# Patient Record
Sex: MALE | Race: WHITE | NOT HISPANIC OR LATINO | Employment: FULL TIME | URBAN - METROPOLITAN AREA
[De-identification: names, ages, dates, MRNs, and addresses within clinical notes are randomized per-mention and may not be internally consistent; named-entity substitution may affect disease eponyms.]

---

## 2018-08-02 ENCOUNTER — HOSPITAL ENCOUNTER (OUTPATIENT)
Dept: FAMILY MEDICINE CLINIC | Facility: CLINIC | Age: 28
Setting detail: SPECIMEN
Discharge: HOME | End: 2018-08-02
Attending: FAMILY MEDICINE | Admitting: FAMILY MEDICINE

## 2018-08-02 LAB
ALBUMIN SERPL-MCNC: 4.6 G/DL (ref 3.5–4.8)
ALBUMIN/GLOB SERPL: 1.8 {RATIO} (ref 1–1.7)
ALP SERPL-CCNC: 58 IU/L (ref 32–91)
ALT SERPL-CCNC: 18 IU/L (ref 17–63)
ANION GAP SERPL CALC-SCNC: 11.2 MMOL/L (ref 10–20)
AST SERPL-CCNC: 19 IU/L (ref 15–41)
BILIRUB SERPL-MCNC: 0.6 MG/DL (ref 0.3–1.2)
BUN SERPL-MCNC: 14 MG/DL (ref 8–20)
BUN/CREAT SERPL: 10.8 (ref 6.2–20.3)
CALCIUM SERPL-MCNC: 9.7 MG/DL (ref 8.9–10.3)
CHLORIDE SERPL-SCNC: 102 MMOL/L (ref 101–111)
CHOLEST SERPL-MCNC: 167 MG/DL
CHOLEST/HDLC SERPL: 3.9 {RATIO}
CONV CO2: 27 MMOL/L (ref 22–32)
CONV LDL CHOLESTEROL DIRECT: 103 MG/DL (ref 0–100)
CONV TOTAL PROTEIN: 7.2 G/DL (ref 6.1–7.9)
CREAT UR-MCNC: 1.3 MG/DL (ref 0.7–1.2)
GLOBULIN UR ELPH-MCNC: 2.6 G/DL (ref 2.5–3.8)
GLUCOSE SERPL-MCNC: 83 MG/DL (ref 65–99)
HDLC SERPL-MCNC: 43 MG/DL
LDLC/HDLC SERPL: 2.4 {RATIO}
LIPID INTERPRETATION: ABNORMAL
POTASSIUM SERPL-SCNC: 4.2 MMOL/L (ref 3.6–5.1)
SODIUM SERPL-SCNC: 136 MMOL/L (ref 136–144)
TRIGL SERPL-MCNC: 117 MG/DL
VLDLC SERPL CALC-MCNC: 21.6 MG/DL

## 2019-06-11 ENCOUNTER — CONVERSION ENCOUNTER (OUTPATIENT)
Dept: FAMILY MEDICINE CLINIC | Facility: CLINIC | Age: 29
End: 2019-06-11

## 2019-06-12 VITALS
HEART RATE: 76 BPM | SYSTOLIC BLOOD PRESSURE: 112 MMHG | HEIGHT: 78 IN | OXYGEN SATURATION: 100 % | DIASTOLIC BLOOD PRESSURE: 75 MMHG | WEIGHT: 219.8 LBS | BODY MASS INDEX: 25.43 KG/M2

## 2019-06-13 ENCOUNTER — TRANSCRIBE ORDERS (OUTPATIENT)
Dept: ADMINISTRATIVE | Facility: HOSPITAL | Age: 29
End: 2019-06-13

## 2019-06-13 DIAGNOSIS — M54.16 LUMBAR BACK PAIN WITH RADICULOPATHY AFFECTING LOWER EXTREMITY: Primary | ICD-10-CM

## 2019-06-13 NOTE — PROGRESS NOTES
Visit Type:  Acute Visit  Referring Provider:  Won Quinn MD  Primary Provider:  Kai PARADA MD    CC:  back pain .    History of Present Illness:          He is in with 5 days of back pain after picking up his 10 month old daughter.  He has pain into his scrotal area.  He has pain down his R hip and leg.  He is having difficulty finding a comfortable position, whether prone , sitting or upright.    Bowel and bladder function are fine.  Sleep has been terrible.  He has not been to work due to the pain and reduced mobility.  He has no prior history of back injury or trauma.  He has not had any family history of back problems.        Past Medical History:     Reviewed history from 07/10/2013 and no changes required:        Chickenpox        MRSA     Past Surgical History:     Reviewed history from 2016 and no changes required:        Birthmark Removed from Head         Appendectomy 3/06/2016        Tonsillectomy         Social History:     Reviewed history from 07/10/2013 and no changes required:        Patient has never smoked.        Passive Smoke: N        Alcohol Use: N        Drug Use: N        HIV/High Risk: N        Regular Exercise: Y            Social History Summary:  Social History Reviewed: 2019        Active Medications (reviewed today):  None    Current Allergies (reviewed today):  No known allergies      Risk Factors:     Smoked Tobacco Use:  Never smoker  Smokeless Tobacco Use:  Never  Passive smoke exposure:  no  Drug use:  no  HIV high-risk behavior:  no  Caffeine use:  4+ drinks per day  Alcohol use:  no  Seatbelt use:  100 %      Review of Systems        See HPI      Vital Signs:    Patient Profile:    28 Years Old Male  Height:     78 inches  Weight:     219.8 pounds  BMI:        25.40     O2 Sat:     100 %  Temp:       97.9 degrees F oral  Pulse rate: 76 / minute  BP Sittin / 75  (left arm)    Cuff size:  large      Problems: Active problems  were reviewed with the patient during this visit.  Medications: Medications were reviewed with the patient during this visit.  Allergies: Allergies were reviewed with the patient during this visit.  No Known Allergy.        Vitals Entered By: Kenya Cortez CMA (June 11, 2019 3:19 PM)      Physical Exam    General:      moderate pain/distress.    Abdomen:       normal bowel sounds; no hepatosplenomegaly no ventral,umbilical hernias or masses noted.    Msk:      gait hobbled  posture slumped for comfort  pos straight leg raise R but not L  Neurologic:      no focal deficits, cranial nerves II-XII grossly intact with normal sensation, reflexes, coordination, muscle strength and tone.        Blood Pressure:  Today's BP: 112/75 mm Hg    Labwork:   Most Recent Lab Results:   LDL: 103 mg/dL 08/02/2018        Impression & Recommendations:    Problem # 1:  Back pain, lumbar, with radiculopathy (ICD-724.4) (PJX35-T76.16)  will treat symptoms  refer for MRI  treat further as indicated  Orders:  MRI LUMBAR SPINE WO (STANDARD) (CPT-73449)      Medications Added to Medication List This Visit:  1)  Prednisone 10 Mg Oral Tablet (Prednisone) .... Take 4 po daily x 2 days, then 3 po daily x 2 days, then 2 po daily x 3 days, then 1 po daily x 3 days  2)  Baclofen 10 Mg Oral Tablet (Baclofen) .... Take 1-2 po tid prn back pain or spasms  3)  Norco  Mg Oral Tablet (Hydrocodone-acetaminophen) .... Take 1 po q4-6 hr prn pain      Patient Instructions:  1)  to be determined            Medications:  PREDNISONE 10 MG ORAL TABLET (PREDNISONE) take 4 po daily x 2 days, then 3 po daily x 2 days, then 2 po daily x 3 days, then 1 po daily x 3 days  #23 x 0      Entered and Authorized by:  Won Quinn MD      Electronically signed by:   Won Quinn MD on 06/11/2019      Method used:    Print then Give to Patient      Note to Pharmacy: Route: ORAL;       RxID:   [id number]  BACLOFEN 10 MG ORAL TABLET (BACLOFEN)  take 1-2 po tid prn back pain or spasms  #60 x 0      Entered and Authorized by:  Won Quinn MD      Electronically signed by:   Won Quinn MD on 06/11/2019      Method used:    Print then Give to Patient      Note to Pharmacy: Route: ORAL;       RxID:   [id number]  NORCO  MG ORAL TABLET (HYDROCODONE-ACETAMINOPHEN) take 1 po q4-6 hr prn pain  #40 x 0      Entered and Authorized by:  Won Quinn MD      Electronically signed by:   Won Quinn MD on 06/11/2019      Method used:    Print then Give to Patient      Note to Pharmacy: Route: ORAL;       RxID:   [id number]                Medication Administration    Orders Added:  1)  Ofc Vst, Est Level III [77303]  2)  MRI LUMBAR SPINE WO (STANDARD) [CPT-02217]  ]    Patient Health Questionnaire    PHQ-2   1. Over the last 2 weeks how often have you been bothered by any of the following problems?     a. Little interest or pleasure in doing things?...Not at all     b. Feeling down, depressed, irritable, or hopeless?...Not at all    Total PHQ-2 Score: 0      Problems:    Added new problem of Back pain, lumbar, with radiculopathy (ICD-724.4) (EHY25-F00.16)          Electronically signed by Won Quinn MD on 06/11/2019 at 4:20 PM  ________________________________________________________________________       Disclaimer: Converted Note message may not contain all data elements that existed in the legacy source system. Please see [employer] for the original note details.

## 2019-06-20 ENCOUNTER — HOSPITAL ENCOUNTER (OUTPATIENT)
Dept: MRI IMAGING | Facility: HOSPITAL | Age: 29
Discharge: HOME | End: 2019-06-20
Admitting: FAMILY MEDICINE

## 2019-06-20 DIAGNOSIS — M54.16 LUMBAR BACK PAIN WITH RADICULOPATHY AFFECTING LOWER EXTREMITY: ICD-10-CM

## 2019-06-20 PROCEDURE — 72148 MRI LUMBAR SPINE W/O DYE: CPT

## 2019-06-26 ENCOUNTER — TELEPHONE (OUTPATIENT)
Dept: FAMILY MEDICINE CLINIC | Facility: CLINIC | Age: 29
End: 2019-06-26

## 2019-06-26 NOTE — TELEPHONE ENCOUNTER
Dr sarabia requesting office visit 6-11 and mri report 6-20    Need asap pt has visit today    Fax [phone]

## 2019-06-27 DIAGNOSIS — M48.061 SPINAL STENOSIS OF LUMBAR REGION WITHOUT NEUROGENIC CLAUDICATION: Primary | ICD-10-CM

## 2020-10-14 ENCOUNTER — TELEMEDICINE (OUTPATIENT)
Dept: FAMILY MEDICINE CLINIC | Facility: TELEHEALTH | Age: 30
End: 2020-10-14

## 2020-10-14 DIAGNOSIS — L25.5 DERMATITIS DUE TO PLANTS, INCLUDING POISON IVY, SUMAC, AND OAK: Primary | ICD-10-CM

## 2020-10-14 PROBLEM — M54.16 LUMBAR RADICULOPATHY: Status: ACTIVE | Noted: 2019-06-11

## 2020-10-14 PROBLEM — Z00.00 ENCOUNTER FOR GENERAL ADULT MEDICAL EXAMINATION WITHOUT ABNORMAL FINDINGS: Status: ACTIVE | Noted: 2018-08-02

## 2020-10-14 PROBLEM — S29.011A STRAIN OF MUSCLE AND TENDON OF FRONT WALL OF THORAX, INITIAL ENCOUNTER: Status: ACTIVE | Noted: 2018-08-02

## 2020-10-14 PROCEDURE — 99213 OFFICE O/P EST LOW 20 MIN: CPT | Performed by: NURSE PRACTITIONER

## 2020-10-14 RX ORDER — PREDNISONE 20 MG/1
TABLET ORAL
Qty: 42 TABLET | Refills: 0 | Status: SHIPPED | OUTPATIENT
Start: 2020-10-14 | End: 2020-11-04

## 2020-10-15 NOTE — PATIENT INSTRUCTIONS
-Oatmeal baths  -Oral over the counter antihistamines (zyrtec, benadryl)  -Calamine lotion  -Take full course of prednisone to avoid rebounding symptoms  -If symptoms worsen, change, or do not improve in a few days, go to urgent care or primary care    Diphenhydramine capsules or tablets  What is this medicine?  DIPHENHYDRAMINE (dye fen ALFRED diaz) is an antihistamine. It is used to treat the symptoms of an allergic reaction. It is also used to treat Parkinson's disease. This medicine is also used to prevent and to treat motion sickness and as a nighttime sleep aid.  This medicine may be used for other purposes; ask your health care provider or pharmacist if you have questions.  COMMON BRAND NAME(S): [place] Plus Allergy, Aller-G-Time, Banophen, Benadryl Allergy, Benadryl Allergy Dye Free, Benadryl Allergy Kapgel, Benadryl Allergy Ultratab, Diphedryl, Diphenhist, Genahist, Mer-Dryl, PHARBEDRYL, Q-Dryl, Sleepinal, Valu-Dryl, Vicks ZzzQuil Nightime Sleep-Aid  What should I tell my health care provider before I take this medicine?  They need to know if you have any of these conditions:  · asthma or lung disease  · glaucoma  · high blood pressure or heart disease  · liver disease  · pain or difficulty passing urine  · prostate trouble  · ulcers or other stomach problems  · an unusual or allergic reaction to diphenhydramine, other medicines foods, dyes, or preservatives such as sulfites  · pregnant or trying to get pregnant  · breast-feeding  How should I use this medicine?  Take this medicine by mouth with a full glass of water. Follow the directions on the prescription label. Take your doses at regular intervals. Do not take your medicine more often than directed. To prevent motion sickness start taking this medicine 30 to 60 minutes before you leave.  Talk to your pediatrician regarding the use of this medicine in children. Special care may be needed.  Patients over 65 years old may have a stronger reaction  and need a smaller dose.  Overdosage: If you think you have taken too much of this medicine contact a poison control center or emergency room at once.  NOTE: This medicine is only for you. Do not share this medicine with others.  What if I miss a dose?  If you miss a dose, take it as soon as you can. If it is almost time for your next dose, take only that dose. Do not take double or extra doses.  What may interact with this medicine?  Do not take this medicine with any of the following medications:  · MAOIs like Carbex, Eldepryl, Marplan, Nardil, and Parnate  This medicine may also interact with the following medications:  · alcohol  · barbiturates, like phenobarbital  · medicines for bladder spasm like oxybutynin, tolterodine  · medicines for blood pressure  · medicines for depression, anxiety, or psychotic disturbances  · medicines for movement abnormalities or Parkinson's disease  · medicines for sleep  · other medicines for cold, cough or allergy  · some medicines for the stomach like chlordiazepoxide, dicyclomine  This list may not describe all possible interactions. Give your health care provider a list of all the medicines, herbs, non-prescription drugs, or dietary supplements you use. Also tell them if you smoke, drink alcohol, or use illegal drugs. Some items may interact with your medicine.  What should I watch for while using this medicine?  Visit your doctor or health care professional for regular check ups. Tell your doctor if your symptoms do not improve or if they get worse.  Your mouth may get dry. Chewing sugarless gum or sucking hard candy, and drinking plenty of water may help. Contact your doctor if the problem does not go away or is severe.  This medicine may cause dry eyes and blurred vision. If you wear contact lenses you may feel some discomfort. Lubricating drops may help. See your eye doctor if the problem does not go away or is severe.  You may get drowsy or dizzy. Do not drive, use  machinery, or do anything that needs mental alertness until you know how this medicine affects you. Do not stand or sit up quickly, especially if you are an older patient. This reduces the risk of dizzy or fainting spells. Alcohol may interfere with the effect of this medicine. Avoid alcoholic drinks.  What side effects may I notice from receiving this medicine?  Side effects that you should report to your doctor or health care professional as soon as possible:  · allergic reactions like skin rash, itching or hives, swelling of the face, lips, or tongue  · changes in vision  · confused, agitated, nervous  · irregular or fast heartbeat  · tremor  · trouble passing urine  · unusual bleeding or bruising  · unusually weak or tired  Side effects that usually do not require medical attention (report to your doctor or health care professional if they continue or are bothersome):  · constipation, diarrhea  · drowsy  · headache  · loss of appetite  · stomach upset, vomiting  · thick mucous  This list may not describe all possible side effects. Call your doctor for medical advice about side effects. You may report side effects to FDA at 8-649-FDA-6245.  Where should I keep my medicine?  Keep out of the reach of children.  Store at room temperature between 15 and 30 degrees C (59 and 86 degrees F). Keep container closed tightly. Throw away any unused medicine after the expiration date.  NOTE: This sheet is a summary. It may not cover all possible information. If you have questions about this medicine, talk to your doctor, pharmacist, or health care provider.  © 2020 Elsevier/Gold Standard (2009-04-06 17:06:22)  Prednisone tablets  What is this medicine?  PREDNISONE (PRED ni sone) is a corticosteroid. It is commonly used to treat inflammation of the skin, joints, lungs, and other organs. Common conditions treated include asthma, allergies, and arthritis. It is also used for other conditions, such as blood disorders and diseases  of the adrenal glands.  This medicine may be used for other purposes; ask your health care provider or pharmacist if you have questions.  COMMON BRAND NAME(S): Deltasone, Predone, Sterapred, Sterapred DS  What should I tell my health care provider before I take this medicine?  They need to know if you have any of these conditions:  · Cushing's syndrome  · diabetes  · glaucoma  · heart disease  · high blood pressure  · infection (especially a virus infection such as chickenpox, cold sores, or herpes)  · kidney disease  · liver disease  · mental illness  · myasthenia gravis  · osteoporosis  · seizures  · stomach or intestine problems  · thyroid disease  · an unusual or allergic reaction to lactose, prednisone, other medicines, foods, dyes, or preservatives  · pregnant or trying to get pregnant  · breast-feeding  How should I use this medicine?  Take this medicine by mouth with a glass of water. Follow the directions on the prescription label. Take this medicine with food. If you are taking this medicine once a day, take it in the morning. Do not take more medicine than you are told to take. Do not suddenly stop taking your medicine because you may develop a severe reaction. Your doctor will tell you how much medicine to take. If your doctor wants you to stop the medicine, the dose may be slowly lowered over time to avoid any side effects.  Talk to your pediatrician regarding the use of this medicine in children. Special care may be needed.  Overdosage: If you think you have taken too much of this medicine contact a poison control center or emergency room at once.  NOTE: This medicine is only for you. Do not share this medicine with others.  What if I miss a dose?  If you miss a dose, take it as soon as you can. If it is almost time for your next dose, talk to your doctor or health care professional. You may need to miss a dose or take an extra dose. Do not take double or extra doses without advice.  What may interact  with this medicine?  Do not take this medicine with any of the following medications:  · metyrapone  · mifepristone  This medicine may also interact with the following medications:  · aminoglutethimide  · amphotericin B  · aspirin and aspirin-like medicines  · barbiturates  · certain medicines for diabetes, like glipizide or glyburide  · cholestyramine  · cholinesterase inhibitors  · cyclosporine  · digoxin  · diuretics  · ephedrine  · female hormones, like estrogens and birth control pills  · isoniazid  · ketoconazole  · NSAIDS, medicines for pain and inflammation, like ibuprofen or naproxen  · phenytoin  · rifampin  · toxoids  · vaccines  · warfarin  This list may not describe all possible interactions. Give your health care provider a list of all the medicines, herbs, non-prescription drugs, or dietary supplements you use. Also tell them if you smoke, drink alcohol, or use illegal drugs. Some items may interact with your medicine.  What should I watch for while using this medicine?  Visit your doctor or health care professional for regular checks on your progress. If you are taking this medicine over a prolonged period, carry an identification card with your name and address, the type and dose of your medicine, and your doctor's name and address.  This medicine may increase your risk of getting an infection. Tell your doctor or health care professional if you are around anyone with measles or chickenpox, or if you develop sores or blisters that do not heal properly.  If you are going to have surgery, tell your doctor or health care professional that you have taken this medicine within the last twelve months.  Ask your doctor or health care professional about your diet. You may need to lower the amount of salt you eat.  This medicine may increase blood sugar. Ask your healthcare provider if changes in diet or medicines are needed if you have diabetes.  What side effects may I notice from receiving this  medicine?  Side effects that you should report to your doctor or health care professional as soon as possible:  · allergic reactions like skin rash, itching or hives, swelling of the face, lips, or tongue  · changes in emotions or moods  · changes in vision  · depressed mood  · eye pain  · fever or chills, cough, sore throat, pain or difficulty passing urine  · signs and symptoms of high blood sugar such as being more thirsty or hungry or having to urinate more than normal. You may also feel very tired or have blurry vision.  · swelling of ankles, feet  Side effects that usually do not require medical attention (report to your doctor or health care professional if they continue or are bothersome):  · confusion, excitement, restlessness  · headache  · nausea, vomiting  · skin problems, acne, thin and shiny skin  · trouble sleeping  · weight gain  This list may not describe all possible side effects. Call your doctor for medical advice about side effects. You may report side effects to FDA at 3-431-WGC-6933.  Where should I keep my medicine?  Keep out of the reach of children.  Store at room temperature between 15 and 30 degrees C (59 and 86 degrees F). Protect from light. Keep container tightly closed. Throw away any unused medicine after the expiration date.  NOTE: This sheet is a summary. It may not cover all possible information. If you have questions about this medicine, talk to your doctor, pharmacist, or health care provider.  © 2020 Elsevier/Gold Standard (2019-09-17 10:54:22)  Poison Ivy Dermatitis  Poison ivy dermatitis is inflammation of the skin that is caused by chemicals in the leaves of the poison ivy plant. The skin reaction often involves redness, swelling, blisters, and extreme itching.  What are the causes?  This condition is caused by a chemical (urushiol) found in the sap of the poison ivy plant. This chemical is sticky and can be easily spread to people, animals, and objects. You can get poison  ivy dermatitis by:  · Having direct contact with a poison ivy plant.  · Touching animals, other people, or objects that have come in contact with poison ivy and have the chemical on them.  What increases the risk?  This condition is more likely to develop in people who:  · Are outdoors often in wooded or marshy areas.  · Go outdoors without wearing protective clothing, such as closed shoes, long pants, and a long-sleeved shirt.  What are the signs or symptoms?  Symptoms of this condition include:  · Redness of the skin.  · Extreme itching.  · A rash that often includes bumps and blisters. The rash usually appears 48 hours after exposure, if you have been exposed before. If this is the first time you have been exposed, the rash may not appear until a week after exposure.  · Swelling. This may occur if the reaction is more severe.  Symptoms usually last for 1-2 weeks. However, the first time you develop this condition, symptoms may last 3-4 weeks.  How is this diagnosed?  This condition may be diagnosed based on your symptoms and a physical exam. Your health care provider may also ask you about any recent outdoor activity.  How is this treated?  Treatment for this condition will vary depending on how severe it is. Treatment may include:  · Hydrocortisone cream or calamine lotion to relieve itching.  · Oatmeal baths to soothe the skin.  · Medicines, such as over-the-counter antihistamine tablets.  · Oral steroid medicine, for more severe reactions.  Follow these instructions at home:  Medicines  · Take or apply over-the-counter and prescription medicines only as told by your health care provider.  · Use hydrocortisone cream or calamine lotion as needed to soothe the skin and relieve itching.  General instructions  · Do not scratch or rub your skin.  · Apply a cold, wet cloth (cold compress) to the affected areas or take baths in cool water. This will help with itching. Avoid hot baths and showers.  · Take oatmeal baths  as needed. Use colloidal oatmeal. You can get this at your local pharmacy or grocery store. Follow the instructions on the packaging.  · While you have the rash, wash clothes right after you wear them.  · Keep all follow-up visits as told by your health care provider. This is important.  How is this prevented?    · Learn to identify the poison ivy plant and avoid contact with the plant. This plant can be recognized by the number of leaves. Generally, poison ivy has three leaves with flowering branches on a single stem. The leaves are typically glossy, and they have jagged edges that come to a point at the front.  · If you have been exposed to poison ivy, thoroughly wash with soap and water right away. You have about 30 minutes to remove the plant resin before it will cause the rash. Be sure to wash under your fingernails, because any plant resin there will continue to spread the rash.  · When hiking or camping, wear clothes that will help you to avoid exposure on the skin. This includes long pants, a long-sleeved shirt, tall socks, and hiking boots. You can also apply preventive lotion to your skin to help limit exposure.  · If you suspect that your clothes or outdoor gear came in contact with poison ivy, rinse them off outside with a garden hose before you bring them inside your house.  · When doing yard work or gardening, wear gloves, long sleeves, long pants, and boots. Wash your garden tools and gloves if they come in contact with poison ivy.  · If you suspect that your pet has come into contact with poison ivy, wash him or her with pet shampoo and water. Make sure to wear gloves while washing your pet.  Contact a health care provider if you have:  · Open sores in the rash area.  · More redness, swelling, or pain in the affected area.  · Redness that spreads beyond the rash area.  · Fluid, blood, or pus coming from the affected area.  · A fever.  · A rash over a large area of your body.  · A rash on your eyes,  mouth, or genitals.  · A rash that does not improve after a few weeks.  Get help right away if:  · Your face swells or your eyes swell shut.  · You have trouble breathing.  · You have trouble swallowing.  These symptoms may represent a serious problem that is an emergency. Do not wait to see if the symptoms will go away. Get medical help right away. Call your local emergency services (911 in the [place] Do not drive yourself to the hospital.  Summary  · Poison ivy dermatitis is inflammation of the skin that is caused by chemicals in the leaves of the poison ivy plant.  · Symptoms of this condition include redness, itching, a rash, and swelling.  · Do not scratch or rub your skin.  · Take or apply over-the-counter and prescription medicines only as told by your health care provider.  This information is not intended to replace advice given to you by your health care provider. Make sure you discuss any questions you have with your health care provider.  Document Released: 12/15/2001 Document Revised: 04/10/2020 Document Reviewed: 12/13/2019  Elsevier Patient Education © 2020 [employer]vier Inc.

## 2020-10-15 NOTE — PROGRESS NOTES
Subjective   Jose Singletary is a 30 y.o. male.     Two days ago he was cleaning in his yard. Yesterday his arm broke out in a red itchy rash on his right forearm and it is spreading to his left arm and also his left leg.        The following portions of the patient's history were reviewed and updated as appropriate: allergies, current medications, past family history, past medical history, past social history, past surgical history and problem list.    Review of Systems   Constitutional: Negative for chills, diaphoresis, fatigue and fever.   Skin: Positive for rash.       Objective   Physical Exam  Constitutional:       General: He is not in acute distress.     Appearance: He is well-developed. He is not diaphoretic.   Pulmonary:      Effort: Pulmonary effort is normal.   Skin:     Findings: Rash present.      Comments: Right forearm, red raised   Neurological:      Mental Status: He is alert and oriented to person, place, and time.   Psychiatric:         Behavior: Behavior normal.           Assessment/Plan   Diagnoses and all orders for this visit:    1. Dermatitis due to plants, including poison ivy, sumac, and oak (Primary)    Other orders  -     predniSONE (DELTASONE) 20 MG tablet; Take 1 tablet by mouth 3 (Three) Times a Day for 7 days, THEN 1 tablet 2 (Two) Times a Day for 7 days, THEN 1 tablet Daily for 7 days.  Dispense: 42 tablet; Refill: 0  -     diphenhydrAMINE (BENADRYL) 2 % cream; Apply  topically to the appropriate area as directed 3 (Three) Times a Day As Needed for Itching.  Dispense: 56.8 g; Refill: 1        I spent 15 minutes in the patient's chart for this video visit.

## 2023-08-18 ENCOUNTER — TRANSCRIBE ORDERS (OUTPATIENT)
Dept: ADMINISTRATIVE | Facility: HOSPITAL | Age: 33
End: 2023-08-18
Payer: COMMERCIAL

## 2023-08-18 ENCOUNTER — LAB (OUTPATIENT)
Dept: LAB | Facility: HOSPITAL | Age: 33
End: 2023-08-18
Payer: COMMERCIAL

## 2023-08-18 DIAGNOSIS — R07.9 CHEST PAIN, UNSPECIFIED TYPE: ICD-10-CM

## 2023-08-18 DIAGNOSIS — Z00.00 ROUTINE GENERAL MEDICAL EXAMINATION AT A HEALTH CARE FACILITY: ICD-10-CM

## 2023-08-18 DIAGNOSIS — Z00.00 ROUTINE GENERAL MEDICAL EXAMINATION AT A HEALTH CARE FACILITY: Primary | ICD-10-CM

## 2023-08-18 LAB
ALBUMIN SERPL-MCNC: 4.9 G/DL (ref 3.5–5.2)
ALBUMIN/GLOB SERPL: 2.2 G/DL
ALP SERPL-CCNC: 67 U/L (ref 39–117)
ALT SERPL W P-5'-P-CCNC: 14 U/L (ref 1–41)
ANION GAP SERPL CALCULATED.3IONS-SCNC: 8 MMOL/L (ref 5–15)
AST SERPL-CCNC: 15 U/L (ref 1–40)
BASOPHILS # BLD AUTO: 0 10*3/MM3 (ref 0–0.2)
BASOPHILS NFR BLD AUTO: 0.7 % (ref 0–1.5)
BILIRUB SERPL-MCNC: 0.3 MG/DL (ref 0–1.2)
BILIRUB UR QL STRIP: NEGATIVE
BUN SERPL-MCNC: 16 MG/DL (ref 6–20)
BUN/CREAT SERPL: 13.9 (ref 7–25)
CALCIUM SPEC-SCNC: 9.7 MG/DL (ref 8.6–10.5)
CHLORIDE SERPL-SCNC: 104 MMOL/L (ref 98–107)
CHOLEST SERPL-MCNC: 174 MG/DL (ref 0–200)
CLARITY UR: CLEAR
CO2 SERPL-SCNC: 29 MMOL/L (ref 22–29)
COLOR UR: YELLOW
CREAT SERPL-MCNC: 1.15 MG/DL (ref 0.76–1.27)
DEPRECATED RDW RBC AUTO: 42.4 FL (ref 37–54)
EGFRCR SERPLBLD CKD-EPI 2021: 85.1 ML/MIN/1.73
EOSINOPHIL # BLD AUTO: 0.1 10*3/MM3 (ref 0–0.4)
EOSINOPHIL NFR BLD AUTO: 2.4 % (ref 0.3–6.2)
ERYTHROCYTE [DISTWIDTH] IN BLOOD BY AUTOMATED COUNT: 13.3 % (ref 12.3–15.4)
GLOBULIN UR ELPH-MCNC: 2.2 GM/DL
GLUCOSE SERPL-MCNC: 89 MG/DL (ref 65–99)
GLUCOSE UR STRIP-MCNC: NEGATIVE MG/DL
HCT VFR BLD AUTO: 45 % (ref 37.5–51)
HDLC SERPL-MCNC: 50 MG/DL (ref 40–60)
HGB BLD-MCNC: 15.1 G/DL (ref 13–17.7)
HGB UR QL STRIP.AUTO: NEGATIVE
KETONES UR QL STRIP: NEGATIVE
LDLC SERPL CALC-MCNC: 111 MG/DL (ref 0–100)
LDLC/HDLC SERPL: 2.2 {RATIO}
LEUKOCYTE ESTERASE UR QL STRIP.AUTO: NEGATIVE
LYMPHOCYTES # BLD AUTO: 2.3 10*3/MM3 (ref 0.7–3.1)
LYMPHOCYTES NFR BLD AUTO: 42.8 % (ref 19.6–45.3)
MCH RBC QN AUTO: 30.7 PG (ref 26.6–33)
MCHC RBC AUTO-ENTMCNC: 33.5 G/DL (ref 31.5–35.7)
MCV RBC AUTO: 91.7 FL (ref 79–97)
MONOCYTES # BLD AUTO: 0.6 10*3/MM3 (ref 0.1–0.9)
MONOCYTES NFR BLD AUTO: 12.1 % (ref 5–12)
NEUTROPHILS NFR BLD AUTO: 2.2 10*3/MM3 (ref 1.7–7)
NEUTROPHILS NFR BLD AUTO: 42 % (ref 42.7–76)
NITRITE UR QL STRIP: NEGATIVE
NRBC BLD AUTO-RTO: 0.1 /100 WBC (ref 0–0.2)
PH UR STRIP.AUTO: 7.5 [PH] (ref 5–8)
PLATELET # BLD AUTO: 231 10*3/MM3 (ref 140–450)
PMV BLD AUTO: 7.8 FL (ref 6–12)
POTASSIUM SERPL-SCNC: 4.4 MMOL/L (ref 3.5–5.2)
PROT SERPL-MCNC: 7.1 G/DL (ref 6–8.5)
PROT UR QL STRIP: NEGATIVE
RBC # BLD AUTO: 4.9 10*6/MM3 (ref 4.14–5.8)
SODIUM SERPL-SCNC: 141 MMOL/L (ref 136–145)
SP GR UR STRIP: 1.03 (ref 1–1.03)
TRIGL SERPL-MCNC: 69 MG/DL (ref 0–150)
TSH SERPL DL<=0.05 MIU/L-ACNC: 2.41 UIU/ML (ref 0.27–4.2)
UROBILINOGEN UR QL STRIP: NORMAL
VLDLC SERPL-MCNC: 13 MG/DL (ref 5–40)
WBC NRBC COR # BLD: 5.3 10*3/MM3 (ref 3.4–10.8)

## 2023-08-18 PROCEDURE — 36415 COLL VENOUS BLD VENIPUNCTURE: CPT

## 2023-08-18 PROCEDURE — 80061 LIPID PANEL: CPT

## 2023-08-18 PROCEDURE — 80053 COMPREHEN METABOLIC PANEL: CPT

## 2023-08-18 PROCEDURE — 81003 URINALYSIS AUTO W/O SCOPE: CPT

## 2023-08-18 PROCEDURE — 85025 COMPLETE CBC W/AUTO DIFF WBC: CPT

## 2023-08-18 PROCEDURE — 84443 ASSAY THYROID STIM HORMONE: CPT

## 2023-08-23 ENCOUNTER — OFFICE VISIT (OUTPATIENT)
Dept: CARDIOLOGY | Facility: CLINIC | Age: 33
End: 2023-08-23
Payer: COMMERCIAL

## 2023-08-23 VITALS
SYSTOLIC BLOOD PRESSURE: 111 MMHG | HEART RATE: 70 BPM | WEIGHT: 234 LBS | DIASTOLIC BLOOD PRESSURE: 76 MMHG | HEIGHT: 78 IN | OXYGEN SATURATION: 98 % | BODY MASS INDEX: 27.07 KG/M2

## 2023-08-23 DIAGNOSIS — R07.2 PRECORDIAL PAIN: Primary | ICD-10-CM

## 2023-08-23 PROCEDURE — 93000 ELECTROCARDIOGRAM COMPLETE: CPT | Performed by: INTERNAL MEDICINE

## 2023-08-23 PROCEDURE — 99203 OFFICE O/P NEW LOW 30 MIN: CPT | Performed by: INTERNAL MEDICINE

## 2023-08-24 ENCOUNTER — PATIENT ROUNDING (BHMG ONLY) (OUTPATIENT)
Dept: CARDIOLOGY | Facility: CLINIC | Age: 33
End: 2023-08-24
Payer: COMMERCIAL

## 2023-08-24 NOTE — PROGRESS NOTES
A My-Chart message has been sent to the patient for PATIENT ROUNDING with [facility] [place]